# Patient Record
Sex: FEMALE | Race: WHITE
[De-identification: names, ages, dates, MRNs, and addresses within clinical notes are randomized per-mention and may not be internally consistent; named-entity substitution may affect disease eponyms.]

---

## 2018-02-12 ENCOUNTER — HOSPITAL ENCOUNTER (OUTPATIENT)
Dept: HOSPITAL 75 - RAD | Age: 58
End: 2018-02-12
Payer: COMMERCIAL

## 2018-02-12 DIAGNOSIS — N63.11: Primary | ICD-10-CM

## 2018-02-12 PROCEDURE — 77066 DX MAMMO INCL CAD BI: CPT

## 2018-02-12 NOTE — DIAGNOSTIC IMAGING REPORT
INDICATION: 

Palpable lump in the right breast.



COMPARISON:  

11/02/2016 and 07/01/2015.



TECHNIQUE: 

Both breasts are markedly dense, limiting the sensitivity of

mammography. Bilateral 3-D CC, MLO, mediolateral, and a right ML

view as well as bilateral exaggerated CC views were performed.

The current study was also evaluated with a Computer Aided

Detection (CAD) system.



FINDINGS:

There are scattered benign-appearing calcifications in both

breasts. A BB marker was placed at the area of palpable

abnormality in the far posterior upper outer right breast. There

is a well-defined circumscribed density at this location

resembling a lymph node. No spiculated mass is detected. No

malignant appearing microcalcifications are seen.



IMPRESSION:  

There is a circumscribed nodular density in the upper outer right

breast near the palpable abnormality. This most likely represents

an intramammary lymph node. Further evaluation with ultrasound is

recommended.



ACR BI-RADS Category 0: Incomplete. (Needs additional imaging

evaluation).

Result letter will be mailed to the patient.

Note: At least 10% of breast cancer is not imaged by mammography.



Dictated by: 



  Dictated on workstation # [id number]

## 2018-02-12 NOTE — DIAGNOSTIC IMAGING REPORT
INDICATION: Palpable lump in the right breast.



Correlation is made with diagnostic mammogram earlier the same

day.



FINDINGS: Sonographic interrogation of the area of palpable

abnormality in the upper-outer right breast was performed. There

is a well-defined hypoechoic mass at the 10:30 location of the

right breast corresponding to a palpable abnormality. This

measures 5 mm x 3 mm x 6 mm and is most consistent with an

intramammary lymph node. No other masses are seen.



IMPRESSION: Intramammary lymph node corresponding to the palpable

abnormality in the upper-outer right breast. No other

abnormalities are identified. The patient may return to routine

annual screening mammography.



ACR BI-RADS Category 2: Benign findings.



Dictated by: 



  Dictated on workstation # [id number]